# Patient Record
Sex: FEMALE | Race: WHITE | Employment: FULL TIME | URBAN - METROPOLITAN AREA
[De-identification: names, ages, dates, MRNs, and addresses within clinical notes are randomized per-mention and may not be internally consistent; named-entity substitution may affect disease eponyms.]

---

## 2021-01-15 RX ORDER — LEVOTHYROXINE SODIUM 200 UG/1
200 TABLET ORAL
COMMUNITY

## 2021-01-15 RX ORDER — FEXOFENADINE HYDROCHLORIDE AND PSEUDOEPHEDRINE HYDROCHLORIDE 180; 240 MG/1; MG/1
1 TABLET, FILM COATED, EXTENDED RELEASE ORAL DAILY
Status: ON HOLD | COMMUNITY
End: 2021-01-22

## 2021-01-15 RX ORDER — METOPROLOL SUCCINATE 25 MG/1
25 TABLET, EXTENDED RELEASE ORAL DAILY
COMMUNITY

## 2021-01-15 RX ORDER — MONTELUKAST SODIUM 10 MG/1
10 TABLET ORAL DAILY
COMMUNITY

## 2021-01-15 RX ORDER — ATORVASTATIN CALCIUM 10 MG/1
10 TABLET, FILM COATED ORAL DAILY
COMMUNITY

## 2021-01-15 RX ORDER — VENLAFAXINE HYDROCHLORIDE 150 MG/1
150 CAPSULE, EXTENDED RELEASE ORAL DAILY
COMMUNITY

## 2021-01-15 NOTE — PERIOP NOTES
[address] [place] [address] Dr Stanton Preprocedure Instructions      1. On the day of your surgery, please report to registration located on the 2nd floor of the  [facility]. yes    2. You must have a responsible adult to drive you to the hospital, stay at the hospital during your procedure and drive you home. If they leave your procedure will not be started (no exceptions). yes    3. Do not have anything to eat or drink (including water, gum, mints, coffee, and juice) after midnight. This does not apply to the medications you were instructed to take by your physician. yesIf you are currently taking Plavix, Coumadin, Aspirin, or other blood-thinning agents, contact your physician for special instructions. not applicable. 4. If you are having a procedure that requires bowel prep: We recommend that you have only clear liquids the day before your procedure and begin your bowel prep by 5:00 pm.  You may continue to drink clear liquids until midnight. If for any reason you are not able to complete your prep please notify your physician immediately. yes    5. Have a list of all current medications, including vitamins, herbal supplements and any other over the counter medications. yes    6. If you wear glasses, contacts, dentures and/or hearing aids, they may be removed prior to procedure, please bring a case to store them in. yes    7. You should understand that if you do not follow these instructions your procedure may be cancelled. If your physical condition changes (I.e. fever, cold or flu) please contact your doctor as soon as possible. 8. It is important that you be on time.   If for any reason you are unable to keep your appointment please call [phone] the day of or your physicians office prior to your scheduled procedure

## 2021-01-18 ENCOUNTER — HOSPITAL ENCOUNTER (OUTPATIENT)
Dept: LAB | Age: 61
Discharge: HOME | End: 2021-01-18
Payer: COMMERCIAL

## 2021-01-18 ENCOUNTER — TRANSCRIBE ORDER (OUTPATIENT)
Dept: REGISTRATION | Age: 61
End: 2021-01-18

## 2021-01-18 DIAGNOSIS — Z01.812 PRE-PROCEDURAL LABORATORY EXAMINATIONS: Primary | ICD-10-CM

## 2021-01-18 DIAGNOSIS — Z01.812 PRE-PROCEDURAL LABORATORY EXAMINATIONS: ICD-10-CM

## 2021-01-18 PROCEDURE — U0003 INFECTIOUS AGENT DETECTION BY NUCLEIC ACID (DNA OR RNA); SEVERE ACUTE RESPIRATORY SYNDROME CORONAVIRUS 2 (SARS-COV-2) (CORONAVIRUS DISEASE [COVID-19]), AMPLIFIED PROBE TECHNIQUE, MAKING USE OF HIGH THROUGHPUT TECHNOLOGIES AS DESCRIBED BY CMS-2020-01-R: HCPCS

## 2021-01-19 LAB — SARS-COV-2, COV2NT: NOT DETECTED

## 2021-01-22 ENCOUNTER — ANESTHESIA (OUTPATIENT)
Dept: ENDOSCOPY | Age: 61
End: 2021-01-22
Payer: COMMERCIAL

## 2021-01-22 ENCOUNTER — HOSPITAL ENCOUNTER (OUTPATIENT)
Age: 61
Setting detail: OUTPATIENT SURGERY
Discharge: HOME | End: 2021-01-22
Attending: INTERNAL MEDICINE | Admitting: INTERNAL MEDICINE
Payer: COMMERCIAL

## 2021-01-22 ENCOUNTER — ANESTHESIA EVENT (OUTPATIENT)
Dept: ENDOSCOPY | Age: 61
End: 2021-01-22
Payer: COMMERCIAL

## 2021-01-22 VITALS
DIASTOLIC BLOOD PRESSURE: 75 MMHG | HEIGHT: 68 IN | BODY MASS INDEX: 36.82 KG/M2 | WEIGHT: 242.95 LBS | RESPIRATION RATE: 19 BRPM | SYSTOLIC BLOOD PRESSURE: 128 MMHG | OXYGEN SATURATION: 100 % | TEMPERATURE: 97.7 F | HEART RATE: 68 BPM

## 2021-01-22 PROCEDURE — 76060000031 HC ANESTHESIA FIRST 0.5 HR: Performed by: INTERNAL MEDICINE

## 2021-01-22 PROCEDURE — 2709999900 HC NON-CHARGEABLE SUPPLY: Performed by: INTERNAL MEDICINE

## 2021-01-22 PROCEDURE — 76040000019: Performed by: INTERNAL MEDICINE

## 2021-01-22 PROCEDURE — 74011000250 HC RX REV CODE- 250: Performed by: NURSE ANESTHETIST, CERTIFIED REGISTERED

## 2021-01-22 PROCEDURE — 74011250636 HC RX REV CODE- 250/636: Performed by: INTERNAL MEDICINE

## 2021-01-22 PROCEDURE — 74011250637 HC RX REV CODE- 250/637: Performed by: INTERNAL MEDICINE

## 2021-01-22 PROCEDURE — 74011250636 HC RX REV CODE- 250/636: Performed by: NURSE ANESTHETIST, CERTIFIED REGISTERED

## 2021-01-22 RX ORDER — NALOXONE HYDROCHLORIDE 0.4 MG/ML
0.4 INJECTION, SOLUTION INTRAMUSCULAR; INTRAVENOUS; SUBCUTANEOUS
Status: DISCONTINUED | OUTPATIENT
Start: 2021-01-22 | End: 2021-01-22 | Stop reason: HOSPADM

## 2021-01-22 RX ORDER — VIT A/VIT C/VIT E/ZINC/COPPER 4296-226
1 CAPSULE ORAL DAILY
COMMUNITY

## 2021-01-22 RX ORDER — EPINEPHRINE 0.1 MG/ML
1 INJECTION INTRACARDIAC; INTRAVENOUS
Status: DISCONTINUED | OUTPATIENT
Start: 2021-01-22 | End: 2021-01-22 | Stop reason: HOSPADM

## 2021-01-22 RX ORDER — DEXTROMETHORPHAN/PSEUDOEPHED 2.5-7.5/.8
1.2 DROPS ORAL
Status: DISCONTINUED | OUTPATIENT
Start: 2021-01-22 | End: 2021-01-22 | Stop reason: HOSPADM

## 2021-01-22 RX ORDER — MIDAZOLAM HYDROCHLORIDE 1 MG/ML
.25-5 INJECTION, SOLUTION INTRAMUSCULAR; INTRAVENOUS
Status: DISCONTINUED | OUTPATIENT
Start: 2021-01-22 | End: 2021-01-22 | Stop reason: HOSPADM

## 2021-01-22 RX ORDER — FLUMAZENIL 0.1 MG/ML
0.2 INJECTION INTRAVENOUS
Status: DISCONTINUED | OUTPATIENT
Start: 2021-01-22 | End: 2021-01-22 | Stop reason: HOSPADM

## 2021-01-22 RX ORDER — PROPOFOL 10 MG/ML
INJECTION, EMULSION INTRAVENOUS
Status: DISCONTINUED | OUTPATIENT
Start: 2021-01-22 | End: 2021-01-22 | Stop reason: HOSPADM

## 2021-01-22 RX ORDER — LIDOCAINE HYDROCHLORIDE 20 MG/ML
INJECTION, SOLUTION EPIDURAL; INFILTRATION; INTRACAUDAL; PERINEURAL AS NEEDED
Status: DISCONTINUED | OUTPATIENT
Start: 2021-01-22 | End: 2021-01-22 | Stop reason: HOSPADM

## 2021-01-22 RX ORDER — CETIRIZINE HYDROCHLORIDE 10 MG/1
CAPSULE, LIQUID FILLED ORAL DAILY
COMMUNITY

## 2021-01-22 RX ORDER — PROPOFOL 10 MG/ML
INJECTION, EMULSION INTRAVENOUS AS NEEDED
Status: DISCONTINUED | OUTPATIENT
Start: 2021-01-22 | End: 2021-01-22 | Stop reason: HOSPADM

## 2021-01-22 RX ORDER — ATROPINE SULFATE 0.1 MG/ML
0.4 INJECTION INTRAVENOUS
Status: DISCONTINUED | OUTPATIENT
Start: 2021-01-22 | End: 2021-01-22 | Stop reason: HOSPADM

## 2021-01-22 RX ORDER — GLYCOPYRROLATE 0.2 MG/ML
INJECTION INTRAMUSCULAR; INTRAVENOUS AS NEEDED
Status: DISCONTINUED | OUTPATIENT
Start: 2021-01-22 | End: 2021-01-22 | Stop reason: HOSPADM

## 2021-01-22 RX ORDER — SODIUM CHLORIDE 9 MG/ML
50 INJECTION, SOLUTION INTRAVENOUS CONTINUOUS
Status: DISCONTINUED | OUTPATIENT
Start: 2021-01-22 | End: 2021-01-22 | Stop reason: HOSPADM

## 2021-01-22 RX ADMIN — LIDOCAINE HYDROCHLORIDE 60 MG: 20 INJECTION, SOLUTION INTRAVENOUS at 07:47

## 2021-01-22 RX ADMIN — PROPOFOL 70 MG: 10 INJECTION, EMULSION INTRAVENOUS at 07:48

## 2021-01-22 RX ADMIN — PROPOFOL 140 MCG/KG/MIN: 10 INJECTION, EMULSION INTRAVENOUS at 07:48

## 2021-01-22 RX ADMIN — PROPOFOL 20 MG: 10 INJECTION, EMULSION INTRAVENOUS at 07:50

## 2021-01-22 RX ADMIN — SODIUM CHLORIDE 50 ML/HR: 9 INJECTION, SOLUTION INTRAVENOUS at 07:20

## 2021-01-22 RX ADMIN — GLYCOPYRROLATE 0.2 MG: 0.2 INJECTION INTRAMUSCULAR; INTRAVENOUS at 07:55

## 2021-01-22 NOTE — PERIOP NOTES
Received report from Lisa Hardin CRNA. See anesthesia record. Patient taken to post-recovery. Post-recovery report given to Ruben Palencia RN. Patient's ABD remains soft and non-tender post procedure. Pt has no complaints at this time and tolerated the procedure well. Endoscope was pre-cleaned at bedside immediately following procedure by Giovanny Merino.

## 2021-01-22 NOTE — DISCHARGE INSTRUCTIONS
[facility]. Clarisa Domingo M.D.  [phone]            COLON DISCHARGE INSTRUCTIONS       2021    Terry Smith  :  1960  Maria Guadaluep Medical Record Number:  [id number]      COLONOSCOPY FINDINGS:  Your colonoscopy showed: mild sigmoid diverticulosis and small internal hemorrhoids, otherwise mucosa within normal.    DISCOMFORT:  Redness at IV site- apply warm compress to area; if redness or soreness persist- contact your physician  There may be a slight amount of blood passed from the rectum  Gaseous discomfort- walking, belching will help relieve any discomfort  You may not operate a vehicle for 12 hours  You may not engage in an occupation involving machinery or appliances for rest of today  You may not drink alcoholic beverages for at least 12 hours  Avoid making any critical decisions for at least 24 hour  DIET:   High fiber diet. - however -  remember your colon is empty and a heavy meal will produce gas. Avoid these foods:  vegetables, fried / greasy foods, carbonated drinks for today     ACTIVITY:  You may resume your normal daily activities it is recommended that you spend the remainder of the day resting -  avoid any strenuous activity. CALL M.D. ANY SIGN OF:   Increasing pain, nausea, vomiting  Abdominal distension (swelling)  New increased bleeding (oral or rectal)  Fever (chills)  Pain in chest area  Bloody discharge from nose or mouth   Shortness of breath    Follow-up Instructions:   Call Dr. Minh Mack if any questions or problems. Telephone # [phone]    Should have a repeat colonoscopy in 10 years. Patient Education        Learning About Diverticulosis and Diverticulitis  What are diverticulosis and diverticulitis? In diverticulosis and diverticulitis, pouches called diverticula form in the wall of the large intestine, or colon. · In diverticulosis, the pouches do not cause any pain or other symptoms.   · In diverticulitis, the pouches get inflamed or infected and cause symptoms. Doctors aren't sure what causes these pouches in the colon. But they think that a low-fiber diet may play a role. Without fiber to add bulk to the stool, the colon has to work harder than normal to push the stool forward. The pressure from this may cause pouches to form in weak spots along the colon. Some people with diverticulosis get diverticulitis. But experts don't know why this happens. What are the symptoms? · In diverticulosis, most people don't have symptoms. But pouches sometimes bleed. · In diverticulitis, symptoms may last from a few hours to a week or more. They include:  ? Belly pain. This is usually in the lower left side. It is sometimes worse when you move. This is the most common symptom. ? Fever and chills. ? Bloating and gas. ? Diarrhea or constipation. ? Nausea and sometimes vomiting.  ? Not feeling like eating. How can you prevent diverticulitis? You may be able to lower your chance of getting diverticulitis. You can do this by taking steps to prevent constipation. · Eat fruits, vegetables, beans, and whole grains every day. These foods are high in fiber. · Drink plenty of fluids. (Drink enough so that your urine is light yellow or clear like water.) If you have kidney, heart, or liver disease and have to limit fluids, talk with your doctor before you increase the amount of fluids you drink. · Get at least 30 minutes of exercise on most days of the week. Walking is a good choice. You also may want to do other activities, such as running, swimming, cycling, or playing tennis or team sports. · Take a fiber supplement, such as Citrucel or Metamucil, every day if needed. Read and follow all instructions on the label. · Schedule time each day for a bowel movement. Having a daily routine may help. Take your time and do not strain when having a bowel movement. Some people avoid nuts, seeds, berries, and popcorn.  They believe that these foods might get trapped in the diverticula and cause pain. But there is no proof that these foods cause diverticulitis or make it worse. How are these problems treated? · The best way to treat diverticulosis is to avoid constipation. · Treatment for diverticulitis includes antibiotics. It often includes a change in your diet. You may need only liquids at first. Your doctor may suggest pain medicines for pain or belly cramps. In some cases, surgery may be needed. Follow-up care is a key part of your treatment and safety. Be sure to make and go to all appointments, and call your doctor if you are having problems. It's also a good idea to know your test results and keep a list of the medicines you take. Where can you learn more? Go to http://www.gray.com/  Enter C109 in the search box to learn more about \"Learning About Diverticulosis and Diverticulitis. \"  Current as of: April 15, 2020               Content Version: 12.6  © 8404-7893 [employer]. Care instructions adapted under license by [employer] (which disclaims liability or warranty for this information). If you have questions about a medical condition or this instruction, always ask your healthcare professional. [address] any warranty or liability for your use of this information.

## 2021-01-22 NOTE — ANESTHESIA PREPROCEDURE EVALUATION
Relevant Problems   No relevant active problems       Anesthetic History   No history of anesthetic complications            Review of Systems / Medical History  Patient summary reviewed and pertinent labs reviewed    Pulmonary        Sleep apnea: CPAP      Pertinent negatives: No asthma     Neuro/Psych              Cardiovascular    Hypertension        Dysrhythmias   Pacemaker    Exercise tolerance: >4 METS  Comments: Family history of sudden death from cardiac dysrthymias. Has had [employer] for > 10 years, without firing!    GI/Hepatic/Renal               Comments: screening colonoscopy Endo/Other        Obesity     Other Findings              Physical Exam    Airway  Mallampati: II  TM Distance: 4 - 6 cm  Neck ROM: normal range of motion   Mouth opening: Normal     Cardiovascular    Rhythm: regular  Rate: normal         Dental  No notable dental hx       Pulmonary  Breath sounds clear to auscultation               Abdominal  GI exam deferred       Other Findings            Anesthetic Plan    ASA: 3  Anesthesia type: MAC          Induction: Intravenous  Anesthetic plan and risks discussed with: Patient

## 2021-01-22 NOTE — PROCEDURES
Myke Stevens M.D.  [phone]            2021          Colonoscopy Operative Report  Carla Bang  :  1960  Maria Guadalupe Medical Record Number:  [id number]      Indications:    Screening colonoscopy     :  Jose Alfredo Coleman MD    Referring Provider: Stone Jimenez MD    Sedation:  MAC anesthesia    Pre-Procedural Exam:      Airway: clear,  No airway problems anticipated  Heart: RRR, without gallops or rubs  Lungs: clear bilaterally without wheezes, crackles, or rhonchi  Abdomen: soft, nontender, nondistended, bowel sounds present  Mental Status: awake, alert and oriented to person, place and time     Procedure Details:  After informed consent was obtained with all risks and benefits of procedure explained and preoperative exam completed, the patient was taken to the endoscopy suite and placed in the left lateral decubitus position. Upon sequential sedation as per above, a digital rectal exam was performed. The Olympus videocolonoscope  was inserted in the rectum and carefully advanced to the cecum, which was identified by the ileocecal valve and appendiceal orifice. The quality of preparation was good. The colonoscope was slowly withdrawn with careful inspection and evaluation between folds. Retroflexion in the rectum was performed. Findings:   Terminal Ileum: not intubated  Cecum: normal  Ascending Colon: normal  Transverse Colon: normal  Descending Colon: normal  Sigmoid: no mucosal lesion appreciated  mild diverticulosis; Rectum: no mucosal lesion appreciated  Grade 1 internal hemorrhoid(s); Interventions:  none    Specimen Removed:  none    Complications: None. EBL:  None. Impression:  Mild sigmoid diverticulosis and small internal hemorrhoids, otherwise mucosa within normal    Recommendations:  -Repeat colonoscopy in 10 years   -High fiber diet.    -Resume normal medication(s).      Discharge Disposition:  Home in the company of a  when able to ambulate.     Jd Puentes MD  1/22/2021  8:05 AM

## 2021-01-22 NOTE — H&P
Myke Stevens M.D.  [phone]            History and Physical       NAME:  Carla Bang   :   1960   MRN:   [id number]       Referring Physician:  Dr. Severino Grijalva Date: 2021 7:44 AM    Chief Complaint:  Colon cancer screening    History of Present Illness:  Patient is a 63 y.o. who is seen for colon cancer screening. Denies any ongoing GI complaints. PMH:  Past Medical History:   Diagnosis Date    Arrhythmia     has ICD- strong family hx of possible Brugata Syndrome.  Asthma     Hypertension     Sleep apnea     not using cpap at this time since using weight       PSH:  Past Surgical History:   Procedure Laterality Date    HX OTHER SURGICAL      colonoscopy.  HX PACEMAKER      ICD- Med[employer]onic ICD. placed . Strong family hx of V-Tach pt states and ? Brugata Syndrome. Allergies:  No Known Allergies    Home Medications:  Prior to Admission Medications   Prescriptions Last Dose Informant Patient Reported? Taking? Cetirizine (ZyrTEC) 10 mg cap 2021 at 0530  Yes Yes   Sig: Take  by mouth daily. atorvastatin (LIPITOR) 10 mg tablet 2021 at 0530  Yes Yes   Sig: Take 10 mg by mouth daily. levothyroxine (SYNTHROID) 200 mcg tablet 2021 at 0530  Yes Yes   Sig: Take 200 mcg by mouth Daily (before breakfast). metoprolol succinate (TOPROL-XL) 25 mg XL tablet 2021 at 0530  Yes Yes   Sig: Take 25 mg by mouth daily. montelukast (Singulair) 10 mg tablet 2021 at 0530  Yes Yes   Sig: Take 10 mg by mouth daily. venlafaxine-SR (Effexor XR) 150 mg capsule 2021 at 0530  Yes Yes   Sig: Take 150 mg by mouth daily. vit A,C,E-zinc-copper (PreserVision AREDS) cap capsule 2021 at 0530  Yes Yes   Sig: Take 1 Cap by mouth daily.       Facility-Administered Medications: None       Hospital Medications:  Current Facility-Administered Medications   Medication Dose Route Frequency    0.9% sodium chloride infusion  50 mL/hr IntraVENous CONTINUOUS    midazolam (VERSED) injection 0.25-5 mg  0.25-5 mg IntraVENous Multiple    naloxone (NARCAN) injection 0.4 mg  0.4 mg IntraVENous Multiple    flumazeniL (ROMAZICON) 0.1 mg/mL injection 0.2 mg  0.2 mg IntraVENous Multiple    simethicone (MYLICON) 44QV/1.6ZL oral drops 80 mg  1.2 mL Oral Multiple    atropine injection 0.4 mg  0.4 mg IntraVENous ONCE PRN    EPINEPHrine (ADRENALIN) 0.1 mg/mL syringe 1 mg  1 mg Endoscopically ONCE PRN       Social History:  Social History     Tobacco Use    Smoking status: Former Smoker     Packs/day: 0.50     Years: 4.00     Pack years: 2.00     Quit date: 1987     Years since quittin.0    Smokeless tobacco: Never Used   Substance Use Topics    Alcohol use: Yes     Comment: occassionally. Family History:  Family History   Problem Relation Age of Onset    Cancer Mother         lung    Arrhythmia Brother     Arrhythmia Brother     Arrhythmia Sister              Review of Systems:      Constitutional: negative fever, negative chills, negative weight loss  Eyes:   negative visual changes  ENT:   negative sore throat, tongue or lip swelling  Respiratory:  negative cough, negative dyspnea  Cards:  negative for chest pain, palpitations, lower extremity edema  GI:   See HPI  :  negative for frequency, dysuria  Integument:  negative for rash and pruritus  Heme:  negative for easy bruising and gum/nose bleeding  Musculoskel: negative for myalgias,  back pain and muscle weakness  Neuro: negative for headaches, dizziness, vertigo  Psych:  negative for feelings of anxiety, depression       Objective:     Patient Vitals for the past 8 hrs:   BP Temp Pulse Resp SpO2 Height Weight   21 0704 (!) 143/76 97.9 °F (36.6 °C) 66 15 100 % 5' 8\" (1.727 m) 110.2 kg (242 lb 15.2 oz)     No intake/output data recorded. No intake/output data recorded.     EXAM:     NEURO-a&o   HEENT-wnl   LUNGS-clear    COR-regular rate and rhythym     ABD-soft , no tenderness, no rebound, good bs     EXT-no edema     Data Review     No results for input(s): WBC, HGB, HCT, PLT, HGBEXT, HCTEXT, PLTEXT in the last 72 hours. No results for input(s): NA, K, CL, CO2, BUN, CREA, GLU, PHOS, CA in the last 72 hours. No results for input(s): AP, TBIL, TP, ALB, GLOB, GGT, AML, LPSE in the last 72 hours. No lab exists for component: SGOT, GPT, AMYP, HLPSE  No results for input(s): INR, PTP, APTT, INREXT in the last 72 hours. There is no problem list on file for this patient. Assessment:   · Colon cancer screening   Plan:   · Colonoscopy today.      Signed By: Geraldene Merlin, MD     1/22/2021  7:44 AM

## 2021-01-22 NOTE — ROUTINE PROCESS
Kellen Lopez 
1960 
[id number] 
 
Situation: 
Verbal report received from: Zahra Pollard 
Procedure: Procedure(s): 
COLONOSCOPY 
 
Background: 
 
Preoperative diagnosis: SCREENING 
Postoperative diagnosis: 1- Diverticulosis 
2- Hemorrhoids.  
 
:  Dr. Bennett 
Assistant(s): Endoscopy Technician-1: Janelle Marques 
Endoscopy RN-1: Zahra Pollard 
 
Specimens: * No specimens in log * 
H. Pylori  no 
 
Assessment: 
Intra-procedure medications  
 
Anesthesia gave intra-procedure sedation and medications, see anesthesia flow sheet yes 
 
Intravenous fluids: NS@ KVO  
 
Vital signs stable  
 
Abdominal assessment: round and soft  
 
Recommendation: 
Discharge patient per MD order. 
Family-spouse 
Permission to share finding with family or friend yes 
 
Endoscopy discharge instructions have been reviewed and given to patient.  The patient verbalized understanding and acceptance of instructions. 
 
 
Dr. Bennett discussed with  spouse procedure findings and next steps.

## 2021-01-22 NOTE — ANESTHESIA POSTPROCEDURE EVALUATION
Procedure(s):  COLONOSCOPY. MAC    Anesthesia Post Evaluation      Multimodal analgesia: multimodal analgesia not used between 6 hours prior to anesthesia start to PACU discharge  Patient location during evaluation: PACU  Patient participation: complete - patient participated  Level of consciousness: awake and alert  Pain score: 0  Airway patency: patent  Anesthetic complications: no  Cardiovascular status: acceptable  Respiratory status: acceptable  Hydration status: acceptable  Post anesthesia nausea and vomiting:  none  Final Post Anesthesia Temperature Assessment:  Normothermia (36.0-37.5 degrees C)      INITIAL Post-op Vital signs:   Vitals Value Taken Time   /79 01/22/21 0815   Temp 36.5 °C (97.7 °F) 01/22/21 0811   Pulse 68 01/22/21 0816   Resp 18 01/22/21 0816   SpO2 100 % 01/22/21 0816   Vitals shown include unvalidated device data.

## 2023-10-01 ENCOUNTER — HOSPITAL ENCOUNTER (EMERGENCY)
Dept: HOSPITAL 93 - ER | Age: 63
Discharge: HOME | End: 2023-10-01
Payer: COMMERCIAL

## 2023-10-01 VITALS — BODY MASS INDEX: 27.89 KG/M2 | HEIGHT: 68 IN | WEIGHT: 184 LBS

## 2023-10-01 DIAGNOSIS — E11.9: ICD-10-CM

## 2023-10-01 DIAGNOSIS — R19.7: Primary | ICD-10-CM

## 2023-10-01 LAB
ALBUMIN SERPL-MCNC: 3.9 GM/DL (ref 3.4–5)
ALP SERPL-CCNC: 140 U/L (ref 50–136)
ALT SERPL-CCNC: 36 U/L (ref 12–78)
AMYLASE SERPL-CCNC: 34 U/L (ref 25–115)
ANION GAP SERPL CALC-SCNC: 11 MMOL/L (ref 10–20)
BASOPHILS NFR BLD AUTO: 0.5 % (ref 0–1.5)
BILIRUB SERPL-MCNC: 0.49 MG/DL (ref 0.3–1.2)
BUN SERPL-MCNC: 19 MG/DL (ref 7–18)
BUN/CREAT SERPL: 15 (ref 7–25)
CALCIUM SERPL-MCNC: 9.1 MG/DL (ref 8.5–10.1)
CHLORIDE SERPL-SCNC: 105 MMOL/L (ref 98–107)
CO2 SERPL-SCNC: 25 MEQ/L (ref 21–32)
CREAT SERPL-MCNC: 1.28 MG/DL (ref 0.55–1.02)
EGFRCR SERPLBLD CKD-EPI 2021: 42.12 ML/MIN/{1.73_M2}
EOSINOPHIL NFR BLD AUTO: 0.7 % (ref 0–7)
ERYTHROCYTE [DISTWIDTH] IN BLOOD BY AUTOMATED COUNT: 13.9 % (ref 11.5–14.5)
GLOBULIN SER CALC-MCNC: 3.8 G/DL (ref 2.4–3.5)
GLUCOSE SERPL-MCNC: 107 MG/DL (ref 65–100)
HCT VFR BLD AUTO: 40.1 % (ref 36–45)
HGB BLD-MCNC: 12.8 G/DL (ref 12–15)
LIPASE SERPL-CCNC: 34 U/L (ref 13–75)
LYMPHOCYTES NFR BLD AUTO: 11.8 % (ref 12–44)
MCH RBC QN AUTO: 29.2 PG (ref 27–32)
MCHC RBC AUTO-ENTMCNC: 32 G/DL (ref 32–36)
MCV RBC AUTO: 91.4 FL (ref 80–100)
MONOCYTES NFR BLD AUTO: 6.7 % (ref 2–10)
NEUTROPHILS NFR BLD AUTO: 80.3 % (ref 47–76)
OSMOLALITY SERPL: 277 MOSM/KG (ref 275–295)
PLATELET # BLD AUTO: 318 K/UL (ref 150–450)
PMV BLD AUTO: 8.1 FL (ref 7.2–11.1)
POTASSIUM SERPL-SCNC: 3.89 MEQ/L (ref 3.5–5.1)
PROT SERPL-MCNC: 7.7 GM/DL (ref 6.4–8.2)
RBC # BLD AUTO: 4.39 M/UL (ref 4–6)
SCC AG SERPL-SCNC: 19 U/L (ref 15–37)
SODIUM SERPL-SCNC: 137 MMOL/L (ref 136–145)
WBC # BLD AUTO: 12.6 K/UL (ref 4.5–11)

## (undated) DEVICE — 1200 GUARD II KIT W/5MM TUBE W/O VAC TUBE: Brand: GUARDIAN

## (undated) DEVICE — CUFF RMFG BP INF SZ 11 DISP -- LAWSON OEM ITEM 238915

## (undated) DEVICE — CATH IV AUTOGRD BC PNK 20GA 25 -- INSYTE

## (undated) DEVICE — 3M™ CUROS™ DISINFECTING CAP FOR NEEDLELESS CONNECTORS 270/CARTON 20 CARTONS/CASE CFF1-270: Brand: CUROS™

## (undated) DEVICE — KIT COLON W/ 1.1OZ LUB AND 2 END

## (undated) DEVICE — SOLIDIFIER MEDC 1200ML -- CONVERT TO 356117

## (undated) DEVICE — (D)SENSOR RMFG 02 PULS OXMTR -- DISC BY MFR USE ITEM 133445

## (undated) DEVICE — BASIN EMSIS 16OZ GRAPHITE PLAS KID SHP MOLD GRAD FOR ORAL

## (undated) DEVICE — BAG BELONG PT PERS CLEAR HANDL

## (undated) DEVICE — ELECTRODE,RADIOTRANSLUCENT,FOAM,3PK: Brand: MEDLINE

## (undated) DEVICE — SIMPLICITY FLUFF UNDERPAD 23X36, MODERATE: Brand: SIMPLICITY

## (undated) DEVICE — Device